# Patient Record
Sex: FEMALE | Race: OTHER | HISPANIC OR LATINO
[De-identification: names, ages, dates, MRNs, and addresses within clinical notes are randomized per-mention and may not be internally consistent; named-entity substitution may affect disease eponyms.]

---

## 2018-10-02 PROBLEM — Z00.00 ENCOUNTER FOR PREVENTIVE HEALTH EXAMINATION: Status: ACTIVE | Noted: 2018-10-02

## 2018-11-12 ENCOUNTER — RECORD ABSTRACTING (OUTPATIENT)
Age: 23
End: 2018-11-12

## 2018-11-12 DIAGNOSIS — Q74.1 CONGENITAL MALFORMATION OF KNEE: ICD-10-CM

## 2018-11-12 DIAGNOSIS — M20.11 HALLUX VALGUS (ACQUIRED), RIGHT FOOT: ICD-10-CM

## 2018-11-12 DIAGNOSIS — Z78.9 OTHER SPECIFIED HEALTH STATUS: ICD-10-CM

## 2018-11-12 DIAGNOSIS — M67.911 UNSPECIFIED DISORDER OF SYNOVIUM AND TENDON, RIGHT SHOULDER: ICD-10-CM

## 2018-11-12 DIAGNOSIS — R20.2 PARESTHESIA OF SKIN: ICD-10-CM

## 2018-11-12 DIAGNOSIS — M20.12 HALLUX VALGUS (ACQUIRED), LEFT FOOT: ICD-10-CM

## 2018-11-12 DIAGNOSIS — E53.8 DEFICIENCY OF OTHER SPECIFIED B GROUP VITAMINS: ICD-10-CM

## 2018-11-12 DIAGNOSIS — Z83.3 FAMILY HISTORY OF DIABETES MELLITUS: ICD-10-CM

## 2018-12-05 ENCOUNTER — APPOINTMENT (OUTPATIENT)
Dept: NEUROLOGY | Facility: CLINIC | Age: 23
End: 2018-12-05

## 2018-12-11 ENCOUNTER — APPOINTMENT (OUTPATIENT)
Dept: RHEUMATOLOGY | Facility: CLINIC | Age: 23
End: 2018-12-11
Payer: COMMERCIAL

## 2018-12-11 VITALS
BODY MASS INDEX: 23 KG/M2 | DIASTOLIC BLOOD PRESSURE: 70 MMHG | SYSTOLIC BLOOD PRESSURE: 120 MMHG | HEIGHT: 62 IN | WEIGHT: 125 LBS

## 2018-12-11 DIAGNOSIS — E55.9 VITAMIN D DEFICIENCY, UNSPECIFIED: ICD-10-CM

## 2018-12-11 DIAGNOSIS — M79.645 PAIN IN LEFT FINGER(S): ICD-10-CM

## 2018-12-11 DIAGNOSIS — M25.531 PAIN IN RIGHT WRIST: ICD-10-CM

## 2018-12-11 DIAGNOSIS — M77.8 OTHER ENTHESOPATHIES, NOT ELSEWHERE CLASSIFIED: ICD-10-CM

## 2018-12-11 PROCEDURE — 99213 OFFICE O/P EST LOW 20 MIN: CPT

## 2019-03-13 ENCOUNTER — TRANSCRIPTION ENCOUNTER (OUTPATIENT)
Age: 24
End: 2019-03-13

## 2019-03-23 PROBLEM — E55.9 VITAMIN D DEFICIENCY: Status: ACTIVE | Noted: 2018-11-12

## 2019-03-23 PROBLEM — M25.531 WRIST PAIN, RIGHT: Status: ACTIVE | Noted: 2018-11-12

## 2019-03-23 PROBLEM — M79.645 PAIN OF FINGER OF LEFT HAND: Status: ACTIVE | Noted: 2019-03-23

## 2019-03-23 PROBLEM — M77.8 RIGHT WRIST TENDINITIS: Status: ACTIVE | Noted: 2019-03-23

## 2019-05-01 ENCOUNTER — APPOINTMENT (OUTPATIENT)
Dept: INTERNAL MEDICINE | Facility: CLINIC | Age: 24
End: 2019-05-01
Payer: MEDICAID

## 2019-05-01 VITALS
WEIGHT: 127 LBS | HEIGHT: 62 IN | BODY MASS INDEX: 23.37 KG/M2 | SYSTOLIC BLOOD PRESSURE: 100 MMHG | DIASTOLIC BLOOD PRESSURE: 66 MMHG

## 2019-05-01 DIAGNOSIS — Z11.1 ENCOUNTER FOR SCREENING FOR RESPIRATORY TUBERCULOSIS: ICD-10-CM

## 2019-05-01 DIAGNOSIS — G89.29 OTHER CHRONIC PAIN: ICD-10-CM

## 2019-05-01 PROCEDURE — 36415 COLL VENOUS BLD VENIPUNCTURE: CPT

## 2019-05-01 PROCEDURE — 99213 OFFICE O/P EST LOW 20 MIN: CPT | Mod: 25

## 2019-05-01 NOTE — HISTORY OF PRESENT ILLNESS
[FreeTextEntry1] : Needs TB test and work forms completed [de-identified] : 23-year-old female, physical August 2018, healthy, no complaints. Needs work forms completed, titers were completed and 2016. Will do QuantiFERON today

## 2019-05-01 NOTE — PHYSICAL EXAM
[No Acute Distress] : no acute distress [Well Developed] : well developed [Well Nourished] : well nourished [Well-Appearing] : well-appearing [Normal Sclera/Conjunctiva] : normal sclera/conjunctiva [PERRL] : pupils equal round and reactive to light [EOMI] : extraocular movements intact [Normal Oropharynx] : the oropharynx was normal [No JVD] : no jugular venous distention [Normal Outer Ear/Nose] : the outer ears and nose were normal in appearance [Thyroid Normal, No Nodules] : the thyroid was normal and there were no nodules present [Supple] : supple [No Lymphadenopathy] : no lymphadenopathy [Clear to Auscultation] : lungs were clear to auscultation bilaterally [No Accessory Muscle Use] : no accessory muscle use [No Respiratory Distress] : no respiratory distress  [Normal Rate] : normal rate  [Normal S1, S2] : normal S1 and S2 [Regular Rhythm] : with a regular rhythm [No Abdominal Bruit] : a ~M bruit was not heard ~T in the abdomen [No Carotid Bruits] : no carotid bruits [No Murmur] : no murmur heard [No Varicosities] : no varicosities [No Edema] : there was no peripheral edema [Pedal Pulses Present] : the pedal pulses are present [No Palpable Aorta] : no palpable aorta [No Extremity Clubbing/Cyanosis] : no extremity clubbing/cyanosis [Non-distended] : non-distended [Non Tender] : non-tender [Soft] : abdomen soft [No HSM] : no HSM [No Masses] : no abdominal mass palpated [Normal Bowel Sounds] : normal bowel sounds [Normal Posterior Cervical Nodes] : no posterior cervical lymphadenopathy [No CVA Tenderness] : no CVA  tenderness [Normal Anterior Cervical Nodes] : no anterior cervical lymphadenopathy [No Joint Swelling] : no joint swelling [No Spinal Tenderness] : no spinal tenderness [No Rash] : no rash [Grossly Normal Strength/Tone] : grossly normal strength/tone [Normal Gait] : normal gait [No Focal Deficits] : no focal deficits [Coordination Grossly Intact] : coordination grossly intact [Normal Insight/Judgement] : insight and judgment were intact [Normal Affect] : the affect was normal [Deep Tendon Reflexes (DTR)] : deep tendon reflexes were 2+ and symmetric

## 2019-05-01 NOTE — PLAN
[FreeTextEntry1] : 23-year-old female presents for clearance for work, physical done in August 2018. Needs TB test today and forms completed. She offers no specific complaints other than occasional discomfort from long-standing bunions and tendinitis

## 2019-05-07 LAB
M TB IFN-G BLD-IMP: NEGATIVE
QUANTIFERON TB PLUS MITOGEN MINUS NIL: 7.01 IU/ML
QUANTIFERON TB PLUS NIL: 0.01 IU/ML
QUANTIFERON TB PLUS TB1 MINUS NIL: 0.01 IU/ML
QUANTIFERON TB PLUS TB2 MINUS NIL: 0 IU/ML

## 2020-12-23 ENCOUNTER — RESULT REVIEW (OUTPATIENT)
Age: 25
End: 2020-12-23

## 2021-07-08 ENCOUNTER — APPOINTMENT (OUTPATIENT)
Dept: PODIATRY | Facility: CLINIC | Age: 26
End: 2021-07-08
Payer: MEDICAID

## 2021-07-08 ENCOUNTER — NON-APPOINTMENT (OUTPATIENT)
Age: 26
End: 2021-07-08

## 2021-07-08 VITALS — HEIGHT: 62 IN | WEIGHT: 130 LBS | BODY MASS INDEX: 23.92 KG/M2

## 2021-07-08 DIAGNOSIS — M20.5X2 OTHER DEFORMITIES OF TOE(S) (ACQUIRED), LEFT FOOT: ICD-10-CM

## 2021-07-08 DIAGNOSIS — M21.622 BUNIONETTE OF LEFT FOOT: ICD-10-CM

## 2021-07-08 DIAGNOSIS — M21.621 BUNIONETTE OF RIGHT FOOT: ICD-10-CM

## 2021-07-08 PROCEDURE — 99203 OFFICE O/P NEW LOW 30 MIN: CPT

## 2021-07-08 PROCEDURE — 73630 X-RAY EXAM OF FOOT: CPT | Mod: LT

## 2021-07-08 NOTE — HISTORY OF PRESENT ILLNESS
[FreeTextEntry1] : Location: 5th toe left foot\par Duration:over a year\par Chronic:yes\par Past Tx: none\par Exacerbated by: exercise and some shoes\par Prior Hx: no, but had tailors bunions sx bilateral many years ago\par

## 2021-07-08 NOTE — PROCEDURE
[FreeTextEntry1] : X-rays were taken in the office multiple views right foot\par X-rays were taken in the office multiple views of the left foot\par mild hypertrophy to 5th met both, dorsal elevation and medial deviation of 5th toe left foot\par had a lengthy discussion with the patient regarding the diagnosis etiology and differential diagnosis as well as treatment options for the presenting problem. Risks alternatives and benefits of treatment ranging from conservative to surgical explained in great detail. I also explained the progression of treatment from conservative to possible surgical treatment options as well as the benefits of each. I do stress conservative treatment if in fact conservative treatment is an option until it no longer provides relief. Over-the-counter products medications padding, and splinting were reviewed as well. All questions asked and answered appropriately to the patient's satisfaction\par A complete and thorough evaluation of the type of shoes they should be wearing and type of shoes for this time of year was discussed with patient.\par follow up prn\par

## 2021-07-08 NOTE — REVIEW OF SYSTEMS
[As Noted in HPI] : as noted in HPI [Arthralgias] : no arthralgias [Joint Pain] : no joint pain [Joint Swelling] : no joint swelling [Joint Stiffness] : joint stiffness [Limb Pain] : no limb pain [Limb Swelling] : no limb swelling [Negative] : Endocrine

## 2021-07-08 NOTE — PHYSICAL EXAM
[General Appearance - Alert] : alert [General Appearance - In No Acute Distress] : in no acute distress [FreeTextEntry3] : Vascular exam reveals palpable pedal pulses, the foot is warm to touch, there was good capillary fill time, the skin is normal in appearance there is no evidence of vascular disease or compromise at this time [de-identified] : mild recurrence of tailors bunion bilateral. cocked up 5th toe left foot with adductovarus deformity annd decreased DF, however extensor and flexor tendons are intact [Skin Color & Pigmentation] : normal skin color and pigmentation [Skin Turgor] : normal skin turgor [] : no rash [Skin Lesions] : no skin lesions [Foot Ulcer] : no foot ulcer [Skin Induration] : no skin induration [FreeTextEntry1] : 2 well healed incisions dorsolateral bilateral [Sensation] : the sensory exam was normal to light touch and pinprick [No Focal Deficits] : no focal deficits [Deep Tendon Reflexes (DTR)] : deep tendon reflexes were 2+ and symmetric [Motor Exam] : the motor exam was normal [Oriented To Time, Place, And Person] : oriented to person, place, and time [Impaired Insight] : insight and judgment were intact [Affect] : the affect was normal

## 2021-12-08 ENCOUNTER — NON-APPOINTMENT (OUTPATIENT)
Age: 26
End: 2021-12-08

## 2021-12-08 ENCOUNTER — APPOINTMENT (OUTPATIENT)
Dept: BREAST CENTER | Facility: CLINIC | Age: 26
End: 2021-12-08
Payer: MEDICAID

## 2021-12-08 VITALS
SYSTOLIC BLOOD PRESSURE: 132 MMHG | HEART RATE: 80 BPM | WEIGHT: 130 LBS | HEIGHT: 62 IN | DIASTOLIC BLOOD PRESSURE: 79 MMHG | BODY MASS INDEX: 23.92 KG/M2

## 2021-12-08 DIAGNOSIS — N64.4 MASTODYNIA: ICD-10-CM

## 2021-12-08 DIAGNOSIS — Z78.9 OTHER SPECIFIED HEALTH STATUS: ICD-10-CM

## 2021-12-08 PROCEDURE — 99203 OFFICE O/P NEW LOW 30 MIN: CPT

## 2021-12-08 PROCEDURE — 99243 OFF/OP CNSLTJ NEW/EST LOW 30: CPT

## 2021-12-08 NOTE — CONSULT LETTER
[Dear  ___] : Dear ~ELIAS, [( Thank you for referring [unfilled] for consultation for _____ )] : Thank you for referring [unfilled] for consultation for [unfilled] [Please see my note below.] : Please see my note below. [Consult Closing:] : Thank you very much for allowing me to participate in the care of this patient.  If you have any questions, please do not hesitate to contact me. [Sincerely,] : Sincerely, [FreeTextEntry3] : Tri Wilhelm MS DO\par Breast Surgeon\par [facility] \[place] [address]\par

## 2021-12-08 NOTE — REVIEW OF SYSTEMS
[Recent Weight Gain (___ Lbs)] : recent [unfilled] ~Ulb weight gain [Sore Throat] : sore throat [Palpitations] : palpitations [Shortness Of Breath] : shortness of breath [SOB on Exertion] : shortness of breath during exertion [Lower Back Pain] : lower back pain [Breast Pain] : breast pain [Breast Swelling] : breast swelling [Loss of Hair] : loss of hair [Negative] : Heme/Lymph

## 2021-12-08 NOTE — ASSESSMENT
[FreeTextEntry1] : 27 yo female with breast pain\par reviewed she is not currently high risk\par We reviewed risk reduction strategies including maintaining a BMI <25, limiting red meat intake and alcoholic beverages to 3 per week and exercise (150 min/ week low intensity or 75 min/week high intensity). And maintaining a normal vitamin D level.\par reviewed SBE\par screening mammogram at age 40 or earlier if clinically warranted\par reviewed common etiologies of breast pain including caffeine, hormones and stress\par she will maintain a diary of her pain and let me know if this does not resolve \par it is likely hormonal and will subside at the start of her menses\par \par She knows to call or return sooner should any concerns or questions arise.\par

## 2021-12-08 NOTE — HISTORY OF PRESENT ILLNESS
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 She states that this is worse right before her period. But that it has improved. She stopped OCP June 2021. Denies any trauma, changes to herbs, diet or supplements.\par \par She does SBE. \par She has not noticed a change in her breast or a breast lump.\par She has not noticed a change in her nipple or nipple area.\par She has not noticed a change in the skin of the breast.\par She is not experiencing nipple discharge.\par She is experiencing left breast pain 5/10 stabbing, intermittent worse with pressure. Occasional right breast pain currently has none. \par She has not noticed a lump or lymph node under the armpit. \par \par BREAST CANCER RISK FACTORS\par Menarche: 10\par Date of LMP: 11/15/2021\par Menopause:  pre \par Grav:  0    Para: 0\par Age at first live birth: n/a\par Nursed: n/a\par Hysterectomy: N \par Oophorectomy: N\par OCP: Y in the past for 1.5 years \par HRT: N\par Last pap/pelvic exam: 12/2020 WNL \par Related family history: none\par Ashkenazi: N\par Mastery risk assessment: BRCAPRO 10.1% TCv7 15.6% TCv8 15.6%\par BRCA testing: No\par Bra size: 34A\par \par Last mammogram:  never                            Location: [facility] \par Report reviewed.                                 Images reviewed.\par Results:\par \par Last ultrasound:  9/15/2021 left                 Location: [facility] \par Report reviewed.                                 Images reviewed. \par Results: BIRADS 2\par no suspicious solid mass. Left axilla: no sonographic abnormality. \par \par Last MRI:  never                                           Location:\par Report reviewed.\par \par

## 2021-12-08 NOTE — PHYSICAL EXAM
[Normocephalic] : normocephalic [Atraumatic] : atraumatic [Supple] : supple [No Supraclavicular Adenopathy] : no supraclavicular adenopathy [Examined in the supine and seated position] : examined in the supine and seated position [Symmetrical] : symmetrical [No dominant masses] : no dominant masses in right breast  [No dominant masses] : no dominant masses left breast [No Nipple Retraction] : no left nipple retraction [No Nipple Discharge] : no left nipple discharge [No Axillary Lymphadenopathy] : no left axillary lymphadenopathy [No Edema] : no edema [No Rashes] : no rashes [No Ulceration] : no ulceration [de-identified] : LORI's TASIAQ [de-identified] : LORI's FABI

## 2023-02-28 ENCOUNTER — OFFICE (OUTPATIENT)
Dept: URBAN - METROPOLITAN AREA CLINIC 122 | Facility: CLINIC | Age: 28
Setting detail: OPHTHALMOLOGY
End: 2023-02-28
Payer: COMMERCIAL

## 2023-02-28 DIAGNOSIS — H50.022: ICD-10-CM

## 2023-02-28 DIAGNOSIS — H52.223: ICD-10-CM

## 2023-02-28 DIAGNOSIS — H52.13: ICD-10-CM

## 2023-02-28 DIAGNOSIS — H53.003: ICD-10-CM

## 2023-02-28 DIAGNOSIS — H52.7: ICD-10-CM

## 2023-02-28 PROCEDURE — 92025 CPTRIZED CORNEAL TOPOGRAPHY: CPT | Performed by: OPHTHALMOLOGY

## 2023-02-28 PROCEDURE — 92015 DETERMINE REFRACTIVE STATE: CPT | Performed by: OPHTHALMOLOGY

## 2023-02-28 PROCEDURE — 92014 COMPRE OPH EXAM EST PT 1/>: CPT | Performed by: OPHTHALMOLOGY

## 2023-02-28 ASSESSMENT — REFRACTION_CURRENTRX
OD_OVR_VA: 20/
OS_CYLINDER: -4.00
OD_CYLINDER: -3.50
OD_VPRISM_DIRECTION: SV
OS_OVR_VA: 20/
OD_SPHERE: -0.75
OS_VPRISM_DIRECTION: SV
OS_AXIS: 175
OD_AXIS: 175
OS_SPHERE: +2.50

## 2023-02-28 ASSESSMENT — SPHEQUIV_DERIVED
OS_SPHEQUIV: 0.875
OD_SPHEQUIV: -2.5
OS_SPHEQUIV: -0.75
OD_SPHEQUIV: -2.5
OS_SPHEQUIV: 0.5
OS_SPHEQUIV: 0.5
OD_SPHEQUIV: -3.125
OD_SPHEQUIV: -2.75

## 2023-02-28 ASSESSMENT — REFRACTION_MANIFEST
OS_AXIS: 175
OS_VA1: 20/40
OD_CYLINDER: -3.50
OS_AXIS: 175
OS_CYLINDER: -4.00
OS_VA1: 20/50
OD_AXIS: 175
OS_CYLINDER: -4.00
OS_AXIS: 175
OD_VA1: 20/25-
OD_VA1: 20/25
OD_SPHERE: -0.75
OD_AXIS: 175
OD_CYLINDER: -3.50
OS_SPHERE: +2.75
OD_SPHERE: -0.75
OS_CYLINDER: -3.75
OD_CYLINDER: -3.50
OD_AXIS: 175
OS_SPHERE: +2.50
OD_VA1: 20/25-2
OS_VA1: 20/40
OD_SPHERE: -1.00
OS_SPHERE: +2.50

## 2023-02-28 ASSESSMENT — REFRACTION_AUTOREFRACTION
OD_AXIS: 180
OD_SPHERE: -1.25
OS_CYLINDER: -4.00
OD_CYLINDER: -3.75
OS_SPHERE: +1.25
OS_AXIS: 180

## 2023-02-28 ASSESSMENT — LID EXAM ASSESSMENTS: OD_TRICHIASIS: ABSENT

## 2023-02-28 ASSESSMENT — VISUAL ACUITY
OD_BCVA: 20/40-1
OS_BCVA: 20/25

## 2024-02-09 ENCOUNTER — OFFICE (OUTPATIENT)
Dept: URBAN - METROPOLITAN AREA CLINIC 122 | Facility: CLINIC | Age: 29
Setting detail: OPHTHALMOLOGY
End: 2024-02-09
Payer: COMMERCIAL

## 2024-02-09 DIAGNOSIS — H53.002: ICD-10-CM

## 2024-02-09 DIAGNOSIS — H50.022: ICD-10-CM

## 2024-02-09 DIAGNOSIS — H52.223: ICD-10-CM

## 2024-02-09 PROCEDURE — 92014 COMPRE OPH EXAM EST PT 1/>: CPT | Performed by: OPHTHALMOLOGY

## 2024-02-09 PROCEDURE — 92025 CPTRIZED CORNEAL TOPOGRAPHY: CPT | Performed by: OPHTHALMOLOGY

## 2024-02-09 ASSESSMENT — SPHEQUIV_DERIVED
OS_SPHEQUIV: 0.875
OD_SPHEQUIV: -2.5
OD_SPHEQUIV: -2.5
OD_SPHEQUIV: -3.125
OD_SPHEQUIV: -2.75
OS_SPHEQUIV: 0.5
OS_SPHEQUIV: 0.5
OS_SPHEQUIV: -0.75

## 2024-02-09 ASSESSMENT — REFRACTION_MANIFEST
OD_SPHERE: -0.75
OD_VA1: 20/25
OD_VA1: 20/25-2
OD_CYLINDER: -3.50
OS_CYLINDER: -4.00
OD_AXIS: 175
OS_VA1: 20/40
OS_SPHERE: +2.50
OD_CYLINDER: -3.50
OS_SPHERE: +2.75
OD_AXIS: 175
OS_AXIS: 175
OD_SPHERE: -1.00
OS_CYLINDER: -4.00
OS_SPHERE: +2.50
OS_VA1: 20/40
OD_CYLINDER: -3.50
OD_SPHERE: -0.75
OS_AXIS: 175
OD_AXIS: 175
OS_AXIS: 175
OS_CYLINDER: -3.75
OS_VA1: 20/50
OD_VA1: 20/25-

## 2024-02-09 ASSESSMENT — REFRACTION_CURRENTRX
OD_SPHERE: -0.75
OS_AXIS: 175
OS_CYLINDER: -4.00
OD_CYLINDER: -3.50
OD_VPRISM_DIRECTION: SV
OS_SPHERE: +2.50
OS_VPRISM_DIRECTION: SV
OD_OVR_VA: 20/
OD_AXIS: 175
OS_OVR_VA: 20/

## 2024-02-09 ASSESSMENT — REFRACTION_AUTOREFRACTION
OD_SPHERE: -1.25
OS_AXIS: 180
OS_CYLINDER: -4.00
OS_SPHERE: +1.25
OD_CYLINDER: -3.75
OD_AXIS: 180

## 2024-02-09 ASSESSMENT — LID EXAM ASSESSMENTS: OD_TRICHIASIS: ABSENT

## 2024-02-09 ASSESSMENT — CONFRONTATIONAL VISUAL FIELD TEST (CVF)
OS_FINDINGS: FULL
OD_FINDINGS: FULL